# Patient Record
Sex: MALE | Race: OTHER | HISPANIC OR LATINO | Employment: FULL TIME | URBAN - METROPOLITAN AREA
[De-identification: names, ages, dates, MRNs, and addresses within clinical notes are randomized per-mention and may not be internally consistent; named-entity substitution may affect disease eponyms.]

---
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
## 2021-03-14 ENCOUNTER — TELEPHONE (OUTPATIENT)
Dept: FAMILY MEDICINE CLINIC | Facility: CLINIC | Age: 21
End: 2021-03-14

## 2021-12-06 ENCOUNTER — LAB (OUTPATIENT)
Dept: LAB | Facility: CLINIC | Age: 21
End: 2021-12-06
Payer: COMMERCIAL

## 2021-12-06 ENCOUNTER — OFFICE VISIT (OUTPATIENT)
Dept: FAMILY MEDICINE CLINIC | Facility: CLINIC | Age: 21
End: 2021-12-06
Payer: COMMERCIAL

## 2021-12-06 VITALS
SYSTOLIC BLOOD PRESSURE: 100 MMHG | DIASTOLIC BLOOD PRESSURE: 62 MMHG | BODY MASS INDEX: 17.44 KG/M2 | OXYGEN SATURATION: 96 % | HEIGHT: 73 IN | WEIGHT: 131.6 LBS | HEART RATE: 65 BPM

## 2021-12-06 DIAGNOSIS — E55.9 VITAMIN D DEFICIENCY: ICD-10-CM

## 2021-12-06 DIAGNOSIS — M79.601 RIGHT ARM PAIN: ICD-10-CM

## 2021-12-06 DIAGNOSIS — D50.9 IRON DEFICIENCY ANEMIA, UNSPECIFIED IRON DEFICIENCY ANEMIA TYPE: ICD-10-CM

## 2021-12-06 DIAGNOSIS — M25.50 ARTHRALGIA, UNSPECIFIED JOINT: ICD-10-CM

## 2021-12-06 DIAGNOSIS — Z23 ENCOUNTER FOR IMMUNIZATION: ICD-10-CM

## 2021-12-06 DIAGNOSIS — Z00.00 WELL ADULT EXAM: Primary | ICD-10-CM

## 2021-12-06 DIAGNOSIS — Z20.2 STD EXPOSURE: ICD-10-CM

## 2021-12-06 DIAGNOSIS — Z11.59 NEED FOR HEPATITIS C SCREENING TEST: ICD-10-CM

## 2021-12-06 DIAGNOSIS — Z11.4 SCREENING FOR HIV (HUMAN IMMUNODEFICIENCY VIRUS): ICD-10-CM

## 2021-12-06 LAB
25(OH)D3 SERPL-MCNC: 12.6 NG/ML (ref 30–100)
ALBUMIN SERPL BCP-MCNC: 4.3 G/DL (ref 3.5–5)
ALP SERPL-CCNC: 78 U/L (ref 46–116)
ALT SERPL W P-5'-P-CCNC: 26 U/L (ref 12–78)
ANION GAP SERPL CALCULATED.3IONS-SCNC: 8 MMOL/L (ref 4–13)
AST SERPL W P-5'-P-CCNC: 20 U/L (ref 5–45)
BASOPHILS # BLD AUTO: 0.05 THOUSANDS/ΜL (ref 0–0.1)
BASOPHILS NFR BLD AUTO: 1 % (ref 0–1)
BILIRUB SERPL-MCNC: 0.91 MG/DL (ref 0.2–1)
BUN SERPL-MCNC: 11 MG/DL (ref 5–25)
CALCIUM SERPL-MCNC: 8.9 MG/DL (ref 8.3–10.1)
CHLORIDE SERPL-SCNC: 102 MMOL/L (ref 100–108)
CHOLEST SERPL-MCNC: 142 MG/DL
CO2 SERPL-SCNC: 30 MMOL/L (ref 21–32)
CREAT SERPL-MCNC: 0.77 MG/DL (ref 0.6–1.3)
EOSINOPHIL # BLD AUTO: 0.05 THOUSAND/ΜL (ref 0–0.61)
EOSINOPHIL NFR BLD AUTO: 1 % (ref 0–6)
ERYTHROCYTE [DISTWIDTH] IN BLOOD BY AUTOMATED COUNT: 13.2 % (ref 11.6–15.1)
FERRITIN SERPL-MCNC: 104 NG/ML (ref 8–388)
GFR SERPL CREATININE-BSD FRML MDRD: 131 ML/MIN/1.73SQ M
GLUCOSE P FAST SERPL-MCNC: 94 MG/DL (ref 65–99)
HAV IGM SER QL: NORMAL
HBV CORE IGM SER QL: NORMAL
HBV SURFACE AG SER QL: NORMAL
HCT VFR BLD AUTO: 44.2 % (ref 36.5–49.3)
HCV AB SER QL: NORMAL
HDLC SERPL-MCNC: 49 MG/DL
HGB BLD-MCNC: 14.3 G/DL (ref 12–17)
IMM GRANULOCYTES # BLD AUTO: 0.01 THOUSAND/UL (ref 0–0.2)
IMM GRANULOCYTES NFR BLD AUTO: 0 % (ref 0–2)
IRON SATN MFR SERPL: 31 % (ref 20–50)
IRON SERPL-MCNC: 91 UG/DL (ref 65–175)
LDLC SERPL CALC-MCNC: 78 MG/DL (ref 0–100)
LYMPHOCYTES # BLD AUTO: 1.22 THOUSANDS/ΜL (ref 0.6–4.47)
LYMPHOCYTES NFR BLD AUTO: 27 % (ref 14–44)
MCH RBC QN AUTO: 27.6 PG (ref 26.8–34.3)
MCHC RBC AUTO-ENTMCNC: 32.4 G/DL (ref 31.4–37.4)
MCV RBC AUTO: 85 FL (ref 82–98)
MONOCYTES # BLD AUTO: 0.62 THOUSAND/ΜL (ref 0.17–1.22)
MONOCYTES NFR BLD AUTO: 14 % (ref 4–12)
NEUTROPHILS # BLD AUTO: 2.55 THOUSANDS/ΜL (ref 1.85–7.62)
NEUTS SEG NFR BLD AUTO: 57 % (ref 43–75)
NONHDLC SERPL-MCNC: 93 MG/DL
NRBC BLD AUTO-RTO: 0 /100 WBCS
PLATELET # BLD AUTO: 257 THOUSANDS/UL (ref 149–390)
PMV BLD AUTO: 10.1 FL (ref 8.9–12.7)
POTASSIUM SERPL-SCNC: 4.3 MMOL/L (ref 3.5–5.3)
PROT SERPL-MCNC: 8.1 G/DL (ref 6.4–8.2)
RBC # BLD AUTO: 5.18 MILLION/UL (ref 3.88–5.62)
SODIUM SERPL-SCNC: 140 MMOL/L (ref 136–145)
TIBC SERPL-MCNC: 297 UG/DL (ref 250–450)
TRIGL SERPL-MCNC: 73 MG/DL
TSH SERPL DL<=0.05 MIU/L-ACNC: 0.91 UIU/ML (ref 0.46–3.98)
WBC # BLD AUTO: 4.5 THOUSAND/UL (ref 4.31–10.16)

## 2021-12-06 PROCEDURE — 80061 LIPID PANEL: CPT

## 2021-12-06 PROCEDURE — 84443 ASSAY THYROID STIM HORMONE: CPT

## 2021-12-06 PROCEDURE — 86592 SYPHILIS TEST NON-TREP QUAL: CPT

## 2021-12-06 PROCEDURE — 99214 OFFICE O/P EST MOD 30 MIN: CPT | Performed by: FAMILY MEDICINE

## 2021-12-06 PROCEDURE — 80074 ACUTE HEPATITIS PANEL: CPT

## 2021-12-06 PROCEDURE — 36415 COLL VENOUS BLD VENIPUNCTURE: CPT

## 2021-12-06 PROCEDURE — 90686 IIV4 VACC NO PRSV 0.5 ML IM: CPT

## 2021-12-06 PROCEDURE — 99395 PREV VISIT EST AGE 18-39: CPT | Performed by: FAMILY MEDICINE

## 2021-12-06 PROCEDURE — 80053 COMPREHEN METABOLIC PANEL: CPT

## 2021-12-06 PROCEDURE — 82728 ASSAY OF FERRITIN: CPT

## 2021-12-06 PROCEDURE — 82306 VITAMIN D 25 HYDROXY: CPT

## 2021-12-06 PROCEDURE — 85025 COMPLETE CBC W/AUTO DIFF WBC: CPT

## 2021-12-06 PROCEDURE — 90471 IMMUNIZATION ADMIN: CPT

## 2021-12-06 PROCEDURE — 87389 HIV-1 AG W/HIV-1&-2 AB AG IA: CPT

## 2021-12-06 PROCEDURE — 83550 IRON BINDING TEST: CPT

## 2021-12-06 PROCEDURE — 83540 ASSAY OF IRON: CPT

## 2021-12-06 RX ORDER — LACTOSE-REDUCED FOOD
1 LIQUID (ML) ORAL DAILY
Qty: 237 ML | Refills: 1 | Status: SHIPPED | OUTPATIENT
Start: 2021-12-06 | End: 2022-01-14 | Stop reason: SDUPTHER

## 2021-12-06 RX ORDER — IBUPROFEN 600 MG/1
600 TABLET ORAL EVERY 6 HOURS PRN
Qty: 60 TABLET | Refills: 0 | Status: SHIPPED | OUTPATIENT
Start: 2021-12-06

## 2021-12-07 LAB
HIV 1+2 AB+HIV1 P24 AG SERPL QL IA: NORMAL
RPR SER QL: NORMAL

## 2021-12-07 RX ORDER — ERGOCALCIFEROL 1.25 MG/1
50000 CAPSULE ORAL WEEKLY
Qty: 12 CAPSULE | Refills: 0 | Status: SHIPPED | OUTPATIENT
Start: 2021-12-07 | End: 2022-02-23

## 2021-12-08 ENCOUNTER — HOSPITAL ENCOUNTER (OUTPATIENT)
Dept: RADIOLOGY | Facility: HOSPITAL | Age: 21
Discharge: HOME | End: 2021-12-08
Attending: FAMILY MEDICINE
Payer: COMMERCIAL

## 2021-12-08 ENCOUNTER — TELEPHONE (OUTPATIENT)
Dept: FAMILY MEDICINE CLINIC | Facility: CLINIC | Age: 21
End: 2021-12-08

## 2021-12-08 DIAGNOSIS — M79.601 RIGHT ARM PAIN: ICD-10-CM

## 2021-12-08 PROCEDURE — 73090 X-RAY EXAM OF FOREARM: CPT

## 2022-01-03 ENCOUNTER — TELEPHONE (OUTPATIENT)
Dept: FAMILY MEDICINE CLINIC | Facility: CLINIC | Age: 22
End: 2022-01-03

## 2022-01-03 DIAGNOSIS — B34.9 VIRAL INFECTION, UNSPECIFIED: Primary | ICD-10-CM

## 2022-01-03 PROCEDURE — 87636 SARSCOV2 & INF A&B AMP PRB: CPT | Performed by: FAMILY MEDICINE

## 2022-01-03 NOTE — LETTER
January 4, 2022     Patient: Sylvester Santana   YOB: 2000   Date of Visit: 1/3/2022       To Whom it May Concern:    Sylvester Cone is under my professional care  Please excuse from work as COVID test is pending  If you have any questions or concerns, please don't hesitate to call           Sincerely,          Kimberlee Rowland, DO

## 2022-01-03 NOTE — TELEPHONE ENCOUNTER
Patient called with the following Covid-19 concern:    Patient was exposed to Covid-19?  no  Date of Exposure?   n/a   Date of last exposure (family member in quarantine)       Patient has the following symptoms: fever for 2 days, nausea, body aches  Date symptoms started: 1/1/21    Is the patient a hospital employee?  no  Has patient been vaccinated? no   If so, when was their last COVID vaccine? N/a    Pt needs work note excuse for today until test results come back  Pt has been using NyQuil and ibuprofen  Will route the following message as HIGH priority to a provider currently in the office for review

## 2022-01-10 ENCOUNTER — TELEMEDICINE (OUTPATIENT)
Dept: FAMILY MEDICINE CLINIC | Facility: CLINIC | Age: 22
End: 2022-01-10
Payer: COMMERCIAL

## 2022-01-10 VITALS — WEIGHT: 131 LBS | BODY MASS INDEX: 17.36 KG/M2 | HEIGHT: 73 IN

## 2022-01-10 DIAGNOSIS — U07.1 COVID-19: Primary | ICD-10-CM

## 2022-01-10 DIAGNOSIS — R11.2 NAUSEA VOMITING AND DIARRHEA: ICD-10-CM

## 2022-01-10 DIAGNOSIS — R19.7 NAUSEA VOMITING AND DIARRHEA: ICD-10-CM

## 2022-01-10 PROCEDURE — 99213 OFFICE O/P EST LOW 20 MIN: CPT | Performed by: FAMILY MEDICINE

## 2022-01-10 NOTE — PATIENT INSTRUCTIONS
Try some Mylanta  Nutrition Tips for Relief of Diarrhea   WHAT YOU NEED TO KNOW:   There are diet changes you can make to help relieve or stop diarrhea  These changes include limiting or avoiding foods and liquids that are high in sugar, fat, fiber, and lactose  Lactose is a sugar found in milk products  Milk products can cause diarrhea in people who are lactose intolerant  You should also drink extra liquids to replace fluids that are lost when you have diarrhea  Diarrhea can lead to dehydration  DISCHARGE INSTRUCTIONS:   Foods to limit or avoid:   · Dairy:      ? Whole milk    ? Half-and-half, cream, and sour cream    ? Regular (whole milk) ice cream    · Grains:      ? Whole wheat and whole grain breads, pasta, cereals, and crackers    ? [place] and wild rice    ? Breads and cereals with seeds or nuts    ? Popcorn    · Fruit and vegetables:      ? All raw fruits, except bananas and melon    ? Dried fruits, including prunes and raisins    ? Canned fruit in heavy syrup    ? Prune juice and any fruit juice with pulp    ? Raw vegetables, except lettuce     ? Fried vegetables    ? Corn, raw and cooked broccoli, cabbage, cauliflower, and gavin greens    · Protein:      ? Fried meat, poultry, and fish    ? High-fat luncheon meats, such as bologna    ? Fatty meats, such as sausage, mckeon, and hot dogs    ? Beans and nuts    · Liquids:      ? Sodas and fruit-flavored drinks    ? Drinks that contain caffeine, such as energy drinks, coffee, and tea     ? Drinks that contain alcohol or sugar alcohol, such as sorbitol    Foods and liquids you may eat or drink:  Most people can tolerate the foods and liquids listed below  If any of them make your symptoms worse, stop eating or drinking them until you feel better  If you are lactose intolerant, avoid milk products  · Dairy:      ? Skim or low-fat milk or evaporated milk    ? Soy milk or buttermilk     ? Low-fat, part-skim, and aged cheese    ?  Yogurt, low-fat ice cream, or sherbert    · Grains:  (Choose foods with less than 2 grams of dietary fiber per serving )     ? White or refined flour breads, bagels, pasta, and crackers    ? Cold or hot cereals made from white or refined flour such as puffed rice, cornflakes, or cream of wheat    ? White rice    · Fruit and vegetables:      ? Bananas or melon    ? Fruit juice without pulp, except prune juice    ? Canned fruit in juice or light syrup    ? Lettuce and most well-cooked vegetables without seeds or skins     ? Strained vegetable juice    · Protein:      ? Tender, well-cooked meat, poultry, or fish    ? Well-cooked eggs or soy foods (cooked without added fat)    ? Smooth nut butters    · Fats:  (Limit fats to less than 8 teaspoons a day)     ? Oil, butter, or margarine, or mayonnaise    ? Cream cheese or salad dressings    · Liquids:      ? For infants, breast milk or formula    ? Oral rehydration solution     ? Decaffeinated coffee or caffeine-free teas    ? Soft drinks without caffeine    Other guidelines to follow:   · Drink liquids as directed  You may need to drink more liquids than usual to prevent dehydration  Ask how much liquid to drink each day and which liquids are best for you  You may need to drink an oral rehydration solution (ORS)  An ORS helps replace fluids and electrolytes that you lose when you have diarrhea  · Eat small meals or snacks every 3 to 4 hours  instead of large meals  Continue eating even if you still have diarrhea  Your diarrhea will continue for a few days but should gradually go away  © Copyright [employer] 2021 Information is for End User's use only and may not be sold, redistributed or otherwise used for commercial purposes  All illustrations and images included in CareNotes® are the copyrighted property of [employer]  or Yusra Vega   The above information is an  only  It is not intended as medical advice for individual conditions or treatments   Talk to your doctor, nurse or pharmacist before following any medical regimen to see if it is safe and effective for you  Acute Nausea and Vomiting, Ambulatory Care   GENERAL INFORMATION:   Acute nausea and vomiting  starts suddenly, gets worse quickly, and lasts a short time  Nausea and vomiting may be caused by pregnancy, alcohol, infection, or medicines  Common related symptoms include the following:   · Fever    · Abdominal swelling    · Pain, tenderness, or a lump in the abdomen    · Splashing sounds heard in your stomach when you move  Seek immediate care for the following symptoms:   · Blood in your vomit or bowel movements    · Sudden, severe pain in your chest and upper abdomen after hard vomiting    · Dizziness, dry mouth, and thirst    · Urinating very little or not at all    · Muscle weakness, leg cramps, and trouble breathing    · A heart beat that is faster than normal    · Vomiting for more than 48 hours  Treatment for acute nausea and vomiting  may include medicines to calm your stomach and stop the vomiting  You may need IV fluids if you are dehydrated  Manage your nausea and vomiting:   · Drink liquids as directed to prevent dehydration  Ask how much liquid to drink each day and which liquids are best for you  You may need to drink an oral rehydration solution (ORS)  ORS contains water, salts, and sugar that are needed to replace the lost body fluids  Ask what kind of ORS to use, how much to drink, and where to get it  · Eat smaller meals, more often  Eat small amounts of food every 2 to 3 hours, even if you are not hungry  Food in your stomach may help decrease your nausea  · Avoid stress  Find ways to relax and manage your stress  Headaches due to stress may cause nausea and vomiting  Get more rest and sleep  Follow up with your healthcare provider as directed:  Write down your questions so you remember to ask them during your visits  CARE AGREEMENT:   You have the right to help plan your care  Learn about your health condition and how it may be treated  Discuss treatment options with your caregivers to decide what care you want to receive  You always have the right to refuse treatment  The above information is an  only  It is not intended as medical advice for individual conditions or treatments  Talk to your doctor, nurse or pharmacist before following any medical regimen to see if it is safe and effective for you  © 2014 [address] is for End User's use only and may not be sold, redistributed or otherwise used for commercial purposes  All illustrations and images included in CareNotes® are the copyrighted property of [employer]  or Juan Jose Subramanian

## 2022-01-10 NOTE — PROGRESS NOTES
COVID-19 Outpatient Progress Note    Assessment/Plan:    Problem List Items Addressed This Visit     None      Visit Diagnoses     COVID-19    -  Primary    Nausea vomiting and diarrhea             Disposition:     May be secondary VGE   Rest  Fluids  BRAT diet  mylanta as needed  Avoid imodium unless diarrhea multiple times a day   No work for now   Call if persists or worsens  I have spent 10 minutes directly with the patient  Encounter provider Adrian Sanchez DO    Provider located at [address] [place] [id number] [phone]    Recent Visits  Date Type Provider Dept   01/10/22 Telemedicine Harinder Booker DO [facility] recent visits within past 7 days and meeting all other requirements  Future Appointments  No visits were found meeting these conditions  Showing future appointments within next 150 days and meeting all other requirements     This virtual check-in was done via telephone and he agrees to proceed  Patient agrees to participate in a virtual check in via telephone or video visit instead of presenting to the office to address urgent/immediate medical needs  Patient is aware this is a billable service  After connecting through Telephone, the patient was identified by name and date of birth  Eva Albarado was informed that this was a telemedicine visit and that the exam was being conducted confidentially over secure lines  My office door was closed  Other methods to assure confidentiality were taken: Thania North was not available for pt  used Doximity per pt request    No one else was in the room  Eva Albarado acknowledged consent and understanding of privacy and security of the telemedicine visit  I informed the patient that I have reviewed his record in Epic and presented the opportunity for him to ask any questions regarding the visit today  The patient agreed to participate      It was my intent to perform this visit via video technology but the patient was not able to do a video connection so the visit was completed via audio telephone only  Verification of patient location:  Patient is located in the following state in which I hold an active license: [place]    Subjective:   Tennille De La Rosa is a 22 y o  male who has been screened for COVID-19  Symptom change since last report: unchanged  Patient's symptoms include cough and vomiting      - Date of symptom onset: 1/1/2022  - Date of positive COVID-19 PCR: 1/3/2022  Type of test: PCR    COVID-19 vaccination status: Not vaccinated    Stomach aches  Started off with fever, chills, cough and sore throat  Those symptoms improved  Cough remains  Today he started with stomach ache, diarrhea and vomiting  Twice with diarrhea  No blood or mucus  Cheese sandwich  No fevers today  Drinking well  Once with vomiting  Stomach ache like he ate too much, bloating  6/10  Has not tried anything for the pain  Going to the [facility] does not help  Whole stomach  Lab Results   Component Value Date    SARSCOV2 Positive (A) 01/03/2022    SARSCOV2 Not Detected 12/04/2020     Past Medical History:   Diagnosis Date    Anemia     since he was a child took iron     Depression     suicide attempt age 9 when parents were   counseling x 1 year  no meds  Past Surgical History:   Procedure Laterality Date    NO PAST SURGERIES       Current Outpatient Medications   Medication Sig Dispense Refill    ergocalciferol (VITAMIN D2) 50,000 units Take 1 capsule (50,000 Units total) by mouth once a week for 12 doses 12 capsule 0    ibuprofen (MOTRIN) 600 mg tablet Take 1 tablet (600 mg total) by mouth every 6 (six) hours as needed for mild pain 60 tablet 0    Nutritional Supplements (Ensure) Take 250 mL by mouth daily chocolate 237 mL 1     No current facility-administered medications for this visit       Allergies   Allergen Reactions    Dye [Iodinated Diagnostic Agents] Food dye in medicine causes hives        Review of Systems   Respiratory: Positive for cough  Gastrointestinal: Positive for vomiting  Objective:    Vitals:    01/10/22 1116   Weight: 59 4 kg (131 lb)   Height: 6' 1 39" (1 864 m)       Physical Exam    VIRTUAL VISIT DISCLAIMER    Tennille De La Rosa verbally agrees to participate in [employer]  Pt is aware that [employer] could be limited without vital signs or the ability to perform a full hands-on physical Kenneth Zhou understands he or the provider may request at any time to terminate the video visit and request the patient to seek care or treatment in person

## 2022-01-14 RX ORDER — LACTOSE-REDUCED FOOD
1 LIQUID (ML) ORAL DAILY
Qty: 237 ML | Refills: 1 | Status: SHIPPED | OUTPATIENT
Start: 2022-01-14
